# Patient Record
Sex: MALE | Race: BLACK OR AFRICAN AMERICAN | Employment: STUDENT | URBAN - METROPOLITAN AREA
[De-identification: names, ages, dates, MRNs, and addresses within clinical notes are randomized per-mention and may not be internally consistent; named-entity substitution may affect disease eponyms.]

---

## 2017-01-01 ENCOUNTER — HOSPITAL ENCOUNTER (OUTPATIENT)
Dept: OTHER | Age: 22
Discharge: HOME | End: 2017-01-31
Attending: ORTHOPAEDIC SURGERY | Admitting: ORTHOPAEDIC SURGERY

## 2017-02-01 ENCOUNTER — HOSPITAL ENCOUNTER (OUTPATIENT)
Dept: OTHER | Age: 22
Discharge: HOME | End: 2017-02-28
Attending: ORTHOPAEDIC SURGERY | Admitting: ORTHOPAEDIC SURGERY

## 2017-03-01 ENCOUNTER — HOSPITAL ENCOUNTER (OUTPATIENT)
Dept: OTHER | Age: 22
Discharge: HOME | End: 2017-03-08
Attending: ORTHOPAEDIC SURGERY | Admitting: ORTHOPAEDIC SURGERY

## 2017-04-10 ENCOUNTER — OFFICE VISIT (OUTPATIENT)
Dept: FAMILY MEDICINE CLINIC | Age: 22
End: 2017-04-10

## 2017-04-10 VITALS
HEIGHT: 70 IN | TEMPERATURE: 98.2 F | HEART RATE: 60 BPM | DIASTOLIC BLOOD PRESSURE: 68 MMHG | OXYGEN SATURATION: 97 % | SYSTOLIC BLOOD PRESSURE: 106 MMHG | RESPIRATION RATE: 12 BRPM | BODY MASS INDEX: 24.77 KG/M2 | WEIGHT: 173 LBS

## 2017-04-10 DIAGNOSIS — J02.9 PHARYNGITIS, UNSPECIFIED ETIOLOGY: ICD-10-CM

## 2017-04-10 DIAGNOSIS — H10.33 ACUTE BACTERIAL CONJUNCTIVITIS OF BOTH EYES: Primary | ICD-10-CM

## 2017-04-10 PROCEDURE — 99213 OFFICE O/P EST LOW 20 MIN: CPT | Performed by: NURSE PRACTITIONER

## 2017-04-10 RX ORDER — CIPROFLOXACIN HYDROCHLORIDE 3.5 MG/ML
1 SOLUTION/ DROPS TOPICAL 4 TIMES DAILY
Qty: 1 BOTTLE | Refills: 0 | Status: SHIPPED | OUTPATIENT
Start: 2017-04-10 | End: 2017-04-20

## 2018-01-24 ENCOUNTER — HOSPITAL ENCOUNTER (OUTPATIENT)
Dept: OTHER | Age: 23
Discharge: HOME | End: 2018-01-31
Attending: ORTHOPAEDIC SURGERY | Admitting: ORTHOPAEDIC SURGERY

## 2018-02-01 ENCOUNTER — HOSPITAL ENCOUNTER (OUTPATIENT)
Dept: OTHER | Age: 23
Discharge: HOME | End: 2018-02-28
Attending: ORTHOPAEDIC SURGERY | Admitting: ORTHOPAEDIC SURGERY

## 2018-03-01 ENCOUNTER — HOSPITAL ENCOUNTER (OUTPATIENT)
Dept: OTHER | Age: 23
Discharge: HOME | End: 2018-03-31
Attending: ORTHOPAEDIC SURGERY | Admitting: ORTHOPAEDIC SURGERY

## 2018-04-01 ENCOUNTER — HOSPITAL ENCOUNTER (OUTPATIENT)
Dept: OTHER | Age: 23
Discharge: HOME | End: 2018-04-30
Attending: ORTHOPAEDIC SURGERY | Admitting: ORTHOPAEDIC SURGERY

## 2018-05-01 ENCOUNTER — HOSPITAL ENCOUNTER (OUTPATIENT)
Dept: OTHER | Age: 23
Discharge: HOME | End: 2018-05-31
Attending: ORTHOPAEDIC SURGERY | Admitting: ORTHOPAEDIC SURGERY

## 2018-06-01 ENCOUNTER — HOSPITAL ENCOUNTER (OUTPATIENT)
Dept: OTHER | Age: 23
Discharge: HOME | End: 2018-06-20
Attending: ORTHOPAEDIC SURGERY | Admitting: ORTHOPAEDIC SURGERY

## 2018-08-20 ENCOUNTER — HOSPITAL ENCOUNTER (OUTPATIENT)
Dept: GENERAL RADIOLOGY | Age: 23
Discharge: HOME | End: 2018-08-20
Attending: ORTHOPAEDIC SURGERY | Admitting: ORTHOPAEDIC SURGERY

## 2018-08-20 DIAGNOSIS — M25.562 LEFT KNEE PAIN, UNSPECIFIED CHRONICITY: ICD-10-CM

## 2020-01-15 ENCOUNTER — HOSPITAL ENCOUNTER (OUTPATIENT)
Dept: PHYSICAL THERAPY | Age: 25
Setting detail: THERAPIES SERIES
Discharge: HOME | End: 2020-01-15
Payer: COMMERCIAL

## 2020-01-15 PROCEDURE — 97161 PT EVAL LOW COMPLEX 20 MIN: CPT

## 2020-01-15 PROCEDURE — 97140 MANUAL THERAPY 1/> REGIONS: CPT

## 2020-01-16 NOTE — FLOWSHEET NOTE
[facility] ([facility]) Physical Therapy at [address], [place], [address]  Phone: [phone]   Fax:[phone]         Physical Therapy Treatment Note  Date:  2020    Patient Name:  Soledad Carcamo      :  1995    MRN: [id number]    Diagnosis: Left Shoulder Laterjet 20       Physician: Dr Julia Russell  Next Doctor Visit:  Pt isnt sure     Insurance/Certification information:      Plan of care signed (Y/N): Pending  Changes to ambulatory summary sheet? 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 L shoulder laterjet 2020     Patient History/Mechanism of Injury: Pt took a hit during a football game in Sept (his senior year) and felt it dislocate. His shoulder was reduced on the sideline. He finished the season wearing a shoulder brace. Laterjet surgery 20. He does not have a parent in town so had a friend drop him off for surgery, someone else came back and got him so he was not able to attend [facility] PT the day after surgery. Subjective/Pain:       01-15-20     Ant Shoulder sharp ache 3-9/10. Taking hydrocodone every 6hrs as prescribed     Unable to sleep more than 1-2 hrs at a time. Had to have roommate help him get dressed this morning             Objective: See Eval. Incision dry with steri strips.       Exercises:  L Laterjet 01-14-20 01-15-20      Visit #1      01/15=1day     ROM          Flex 30     Abd 30     IR at 30 abd 30     ER at 30 abd 0     Manual      Supine PROM To open endfeel only 10 min           Exercises      Neck SB stretches      Seated Scap Retraction      Wrist flex Ext                                                                         Home Exercises Given: Wear brace at all times    Assessment:  Looks good for one day post op    Treatment/Activity Tolerance:  [] Patient tolerated treatment well [] Patient limited by fatigue  [x] Patient limited by pain  [] Patient limited by other medical complications  [] Other:     Plan: See Attached Protocol    Time In/Time Out:  2562-5467                      Timed Code/Total Treatment Minutes:  Eval Man    Electronically signed by:

## 2020-01-16 NOTE — PROGRESS NOTES
hours  Sleeping all night   Posture Wearing sling, guarded, painful posture Normal upright without compensatory patterns   Scapular Symmetry Unable to assess due to 1 day postop symmetrical   PROM Flexion 30 deg PROM To tolerance at wk 6   PROM ER (Supine 30 abd) 0 deg To tolerance at wk 6   PROM IR (Supine 30 abd) 25deg To tolerance at wk 6   MMT Biceps NT Assess at wk 12   MMT Triceps NT Wk 12   Microfet Abd  NT Wk 12   Microfet Flexion NT Wk 12   Microfet ER Elbow Support NT Wk 12   Microfet ir Elbow Support NT Wk 12     Goal Status: [] Achieved [] Partially Achieved  [x] Not Achieved, established today     Assessment:  LOOKS GREAT FOR ONE DAY POSTOP. INCISION DRY, COVERED WITH STERISTRIPS. Rehab Potential:   [x] Excellent [] Good [] Fair  [] Poor     Education on: surgical procedure, precautions. Plan: Will see 2X per week for 4 weeks then re-assess. Follow attached protocol    [x] Therapeutic Exercise  [x] Modalities:  [] Ultrasound [] Electrical Stimulation [] Cervical Traction   [x] Therapeutic Activity        [] Gait Training        [x] Neuromuscular Re-education   [x] Instruction in HEP        [x] Manual Therapy        [] Aquatic Therapy                               [] Vasopneumatic   [] Other    Physical Therapy Certification/Re-Certification Form    Dr Ramirez Vu   The following patient has been evaluated for physical therapy services and for therapy to continue, insurance requires physician review of the treatment plan initially and every 90 days. Please review the attached evaluation and/or summary of the patient's plan of care, and verify that you agree therapy should continue by signing the attached document and sending it back to our office. Patient agrees with established plan of care and assisted in the development of their short term and long term goals. Patient had no adverse reaction with initial treatment and there are no barriers to learning.  Demonstrates no mental or cognitive

## 2020-01-20 ENCOUNTER — HOSPITAL ENCOUNTER (OUTPATIENT)
Dept: PHYSICAL THERAPY | Age: 25
Setting detail: THERAPIES SERIES
Discharge: HOME | End: 2020-01-20
Payer: COMMERCIAL

## 2020-01-20 PROCEDURE — 97110 THERAPEUTIC EXERCISES: CPT

## 2020-01-20 PROCEDURE — 97140 MANUAL THERAPY 1/> REGIONS: CPT

## 2020-01-20 NOTE — FLOWSHEET NOTE
[facility] ([facility]) Physical Therapy at [address] [place], [address]  Phone: [phone]   Fax:[phone]         Physical Therapy Treatment Note  Date:  2020    Patient Name:  Christian Cantu      :  1995    MRN: [id number]    Diagnosis: Left Shoulder Laterjet 20       Physician: Dr Valentino Chaco  Next Doctor Visit:  Pt isnt sure     Insurance/Certification information:      Plan of care signed (Y/N): Pending  Changes to ambulatory summary sheet? 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 L shoulder laterjet 2020     Patient History/Mechanism of Injury: Pt took a hit during a football game in Sept (his senior year) and felt it dislocate. His shoulder was reduced on the sideline. He finished the season wearing a shoulder brace. Laterjet surgery 20. He does not have a parent in town so had a friend drop him off for surgery, someone else came back and got him so he was not able to attend [facility] PT the day after surgery. Subjective/Pain:       01-15-20   01-20-20    Ant Shoulder sharp ache 3-9/10. Taking hydrocodone every 6hrs as prescribed  Ant shoulder ache 1-8/10. Worst in am when he first wakes. Taking one dose of hydrocodone per day   Unable to sleep more than 1-2 hrs at a time. Had to have   roommate help him get dressed   this morning   Waking 1-2 times per night due to pain. Dressing independently.   Numbness in 4th and 5th fingers a couple times a day for 5-10 min, pt thinks the brace puts pressure on elbow so he changes positions and numbness goes away      Objective:

## 2020-01-27 ENCOUNTER — HOSPITAL ENCOUNTER (OUTPATIENT)
Dept: PHYSICAL THERAPY | Age: 25
Setting detail: THERAPIES SERIES
Discharge: HOME | End: 2020-01-27
Payer: COMMERCIAL

## 2020-01-27 PROCEDURE — 97530 THERAPEUTIC ACTIVITIES: CPT

## 2020-01-27 PROCEDURE — 97110 THERAPEUTIC EXERCISES: CPT

## 2020-01-27 PROCEDURE — 97112 NEUROMUSCULAR REEDUCATION: CPT

## 2020-01-27 NOTE — FLOWSHEET NOTE
See Eval. Incision dry with steri strips.     Exercises:  L Angel Luis 01-14-20 01-15-20 01-21-20 02-27-20    Visit #1 Visit#2 Visit#    01/15=1day 01/21=1wk 1/28=2wks   ROM          Flex 30 80 90   Abd 30 50 70   IR at 30 abd 30 45 open 45 open   ER at 30 abd 0 5 17deg   Manual      Supine PROM To open endfeel only 10 min 20 min today 20 min manual stretching         Exercises      Neck SB stretches  2e06liu ea way 8l28atb   Seated Scap Retraction  Seated x30 Seated with arms on table x30   Wrist flex Ext  Ext 1# on [id number], flex with arm at side x30 1#x30   Table slide  Used bedside table+chair 3g13nyk [id number] on table, added ER 6b28obq   Standing IR ER AROM   Arms at side x30                     Home Exercises Given: Wear brace at all times  DASH=77.5%    Assessment:  Looks good for two weeks postop    Treatment/Activity Tolerance:  [x] Patient tolerated treatment well [] Patient limited by fatigue  [x] Patient limited by pain  [] Patient limited by other medical complications  [] Other:     Plan: twice a week, follow attached protocol    Time In/Time Out:  9740-0507                    Timed Code/Total Treatment Minutes:  Man1 TE1 TA1    Electronically signed by:

## 2020-01-30 ENCOUNTER — HOSPITAL ENCOUNTER (OUTPATIENT)
Dept: PHYSICAL THERAPY | Age: 25
Setting detail: THERAPIES SERIES
Discharge: HOME | End: 2020-01-30
Payer: COMMERCIAL

## 2020-01-30 PROCEDURE — 97530 THERAPEUTIC ACTIVITIES: CPT

## 2020-01-30 PROCEDURE — 97140 MANUAL THERAPY 1/> REGIONS: CPT

## 2020-01-30 NOTE — FLOWSHEET NOTE
[address]  Physical Therapy at [address], [place], [address]  Phone: [phone]   Fax:[phone]         Physical Therapy Treatment Note  Date:  2020    Patient Name:  Jersey Choi      :  1995    MRN: [id number]    Diagnosis: Left Shoulder Laterjet 20       Physician: Dr John Rios  Next Doctor Visit:  Pt isnt sure     Insurance/Certification information:      Plan of care signed (Y/N): Pending  Changes to ambulatory summary sheet? 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 L shoulder laterjet 2020     Patient History/Mechanism of Injury: Pt took a hit during a football game in Sept (his senior year) and felt it dislocate. His shoulder was reduced on the sideline. He finished the season wearing a shoulder brace. Laterjet surgery 20. He does not have a parent in town so had a friend drop him off for surgery, someone else came back and got him so he was not able to attend [facility] PT the day after surgery. Subjective/Pain:       01-15-20   01-27-20    Ant Shoulder sharp ache 3-9/10. Taking hydrocodone every 6hrs as prescribed  Ant shoulder ache 5/10 first thing in am, rest of day = 1-2/10 average. No hydrocodone    Unable to sleep more than 1-2 hrs at a time. Had to have   roommate help him get dressed   this morning   Not waking due to pain. Dressing independently. Numbness in 4th and 5th fingers is gone     Objective: Incisions dry, healed and looks good. Steri strips off.     Exercises:  L Laterjet 01-14-20 01-15-20 01-21-20 02-27-20 01-30-20    Visit #1 Visit#2 Visit# Visit#4    01/15=1day 01/21=1wk 1/28=2wks 2/4=3wks   ROM           Flex 30 80 90 95   Abd 30 50 70 70   IR at 30 abd 30 45 open 45 open 50 open   ER at 30 abd 0 5 17deg 25 deg   Manual       Supine PROM To open endfeel only 10 min 20 min today 20 min manual stretching 20 min manual gentle stretching           Exercises       Neck SB stretches  3h70gfi ea way 2i95run 1b32bhw   Seated Scap Retraction  Seated x30 Seated with arms on table x30 Seated x30   Wrist flex Ext  Ext 1# on [id number], flex with arm at side x30 1#x30 2# x30 flex + Ext   Table slide  Used bedside table+chair 2w94fds unyw1f31 on table, added ER 6x18nen 0r81yjm flex  2x60 sec ER   Standing IR ER AROM   Arms at side x30 x30 arms at side   Ball closed chain on table    x30 fwd/back  x30 side to side   PPT    Next rx          Home Exercises Given: Wear brace at all times except when doing exercises    DASH=77.5%    Assessment:  Looks great for 2.5 wks post op.   Tight ER, stopping at endfeel    Treatment/Activity Tolerance:  [x] Patient tolerated treatment well [] Patient limited by fatigue  [x] Patient limited by pain  [] Patient limited by other medical complications  [] Other:     Plan: twice a week, follow attached protocol    Time In/Time Out:  6011-2720                    Timed Code/Total Treatment Minutes:  Cameron Lagunas    Electronically signed by:

## 2020-02-03 ENCOUNTER — HOSPITAL ENCOUNTER (OUTPATIENT)
Dept: PHYSICAL THERAPY | Age: 25
Setting detail: THERAPIES SERIES
Discharge: HOME | End: 2020-02-03
Payer: COMMERCIAL

## 2020-02-03 PROCEDURE — 97140 MANUAL THERAPY 1/> REGIONS: CPT

## 2020-02-03 PROCEDURE — 97530 THERAPEUTIC ACTIVITIES: CPT

## 2020-02-03 NOTE — FLOWSHEET NOTE
1/28=2wks 2/4=3wks 2/4=3wks   ROM          Flex 90 95 95   Abd 70 70 70   IR at 30 abd 45 open 50 open    ER at 30 abd 17deg 25 deg 25deg   Manual      Supine PROM 20 min manual stretching 20 min manual gentle stretching  20min         Exercises      Neck SB stretches 3i02krn 5v55veb 6q57ctq   Seated Scap Retraction Seated with arms on table x30 Seated x30 Seated x30   Wrist flex Ext 1#x30 2# x30 flex + Ext 2#x30   Table slide [id number] on table, added ER 0y06sba 1x63gfz flex  2x60 sec ER 2h28nce flex  2c75yxf ER   Standing IR ER AROM Arms at side x30 x30 arms at side x30 arms at side   Ball closed chain on table  x30 fwd/back  x30 side to side x30cw x30ccw   PPT  Next rx x30         Home Exercises Given: Wear brace at all times except when doing exercises    DASH=77.5%    Assessment:  Looks great for 3wks post op.   Tight ER, stopping at endfeel    Treatment/Activity Tolerance:  [x] Patient tolerated treatment well [] Patient limited by fatigue  [x] Patient limited by pain  [] Patient limited by other medical complications  [] Other:     Plan: twice a week, follow attached protocol    Time In/Time Out:  6374-0637                  Timed Code/Total Treatment Minutes:  Po Hobson    Electronically signed by:

## 2020-02-06 ENCOUNTER — HOSPITAL ENCOUNTER (OUTPATIENT)
Dept: PHYSICAL THERAPY | Age: 25
Setting detail: THERAPIES SERIES
Discharge: HOME | End: 2020-02-06
Payer: COMMERCIAL

## 2020-02-06 PROCEDURE — 97110 THERAPEUTIC EXERCISES: CPT

## 2020-02-06 PROCEDURE — 97112 NEUROMUSCULAR REEDUCATION: CPT

## 2020-02-06 PROCEDURE — 97530 THERAPEUTIC ACTIVITIES: CPT

## 2020-02-06 NOTE — FLOWSHEET NOTE
[facility] ([facility]) Physical Therapy at [address], [address]  Phone: [phone]   Fax:[phone]         Physical Therapy Treatment Note  Date:  2020    Patient Name:  Akilah Palacios      :  1995    MRN: [id number]    Diagnosis: Left Shoulder Laterjet 20       Physician: Dr Elisabet Adams  Next Doctor Visit:  Pt isnt sure     Insurance/Certification information:      Plan of care signed (Y/N): Pending  Changes to ambulatory summary sheet? 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 L shoulder laterjet 2020     Patient History/Mechanism of Injury: Pt took a hit during a football game in Sept (his senior year) and felt it dislocate. His shoulder was reduced on the sideline. He finished the season wearing a shoulder brace. Laterjet surgery 20. He does not have a parent in town so had a friend drop him off for surgery, someone else came back and got him so he was not able to attend [facility] PT the day after surgery. Subjective/Pain:       01-15-20   02-03-20    Ant Shoulder sharp ache 3-9/10. Taking hydrocodone every 6hrs as prescribed  Ant shoulder ache 5/10 first thing in am, rest of day = 1-2/10 average. No hydrocodone    Unable to sleep more than 1-2 hrs at a time. Had to have   roommate help him get dressed   this morning   Not waking due to pain. Dressing independently. Numbness in 4th and 5th fingers is gone     Objective: Incisions dry, healed and looks good. Steri strips off.     Exercises:  L Laterjet 20    Visit# Visit#4 Visit#5 Visit#6    1/28=2wks 2/4=3wks 2/4=3wks 2/4=3wks   ROM           Flex 90 95 95    Abd 70 70 70    IR at 30 abd 45 open 50 open  50 open   ER at 30 abd 17deg 25 deg 25deg 30 **tight   Manual       Supine PROM 20 min manual stretching 20 min manual gentle stretching  20min 10min          Exercises       Neck SB stretches 3m95hex 4j46nop 0o43qzc    Seated Scap Retraction Seated with arms on table x30 Seated x30 Seated x30 Seated x30   Wrist flex Ext 1#x30 2# x30 flex + Ext 2#x30 3#x30   Table slide [id number] on table, added ER 5h92ydv 3j56tcf flex  2x60 sec ER 4f31feq flex  5g81znx ER 7f98ebd flex  7v78rug ER   SL ER    x30   Standing IR ER AROM Arms at side x30 x30 arms at side x30 arms at side x30   Ball closed chain on table  x30 fwd/back  x30 side to side x30cw x30ccw On wall side to side and up/down x30   PPT  Next rx x30           Home Exercises Given: Wear brace at all times except when doing exercises    DASH=77.5%    Assessment:  Looks great for 3wks post op.   Tight ER, stopping at endfeel    Treatment/Activity Tolerance:  [x] Patient tolerated treatment well [] Patient limited by fatigue  [x] Patient limited by pain  [] Patient limited by other medical complications  [] Other:     Plan: twice a week, follow attached protocol    Time In/Time Out:  0785-6296                 Timed Code/Total Treatment Minutes:  Augustine Villegas    Electronically signed by:

## 2020-02-10 ENCOUNTER — HOSPITAL ENCOUNTER (OUTPATIENT)
Dept: PHYSICAL THERAPY | Age: 25
Setting detail: THERAPIES SERIES
Discharge: HOME | End: 2020-02-10
Payer: COMMERCIAL

## 2020-02-10 PROCEDURE — 97140 MANUAL THERAPY 1/> REGIONS: CPT

## 2020-02-10 PROCEDURE — 97110 THERAPEUTIC EXERCISES: CPT

## 2020-02-10 PROCEDURE — 97530 THERAPEUTIC ACTIVITIES: CPT

## 2020-02-10 NOTE — FLOWSHEET NOTE
o Gain muscular endurance with high repetition of 30-50, low resistance 1-3 lbs)   o Exercises should be progressive in terms of muscle demand / intensity, shoulder elevation, and stress on the anterior joint capsule   o Nearly full elevation in the scapula plane should be achieved before beginning elevation in other planes   o All activities should be pain free and without substitution patterns   o Exercises should consist of both open and closed chain activities   o No heavy lifting or plyometrics should be performed at this time   - Initiate full can scapular plane raises to 90 degrees with good mechanics   - Initiate ER/IR strengthening using exercise tubing at 0° of abduction (use towel roll)   - Initiate sidelying ER with towel roll   - Initiate manual resistance ER supine in scapular plane (light resistance)   - Initiate prone rowing at 30/45/90 degrees of abduction to neutral arm position    Continued cryotherapy for pain and inflammation    Continued patient education: posture, joint protection, positioning, hygiene, etc.       Exercises:  L Laterjet 01-14-20 02-03-20 02-06-20 02-10-20    Visit#5 Visit#6 Visit#7    2/4=3wks 2/4=3wks 2/11=4wks   Bike   5min   ROM          Flex 95  100   IR at 30 abd  50 open 60   ER at 30 abd 25deg 30 **tight 35 tight   Manual      Supine PROM 20min 10min 10min   LLLD ER stretch   Yellow tb 3min   Exercises      Table slides ER + Flex   7h29zzj each   SL ER  x20 x20   Cane punch   to90 x30   PPT   x30   rythmic stabs   Next rx   Standing Scap Retraction Seated x30 Seated x30 x30   Standing ER   x30   Standing Isometrics   IR ER 5sec hold x10, add flex + abd next rx   Wrist flex Ext 2#x30 3#x30 3#x30   Ball closed chain on wall x30cw x30ccw On wall side to side and up/down x30 On wall x30 up down + x30 side to side         Home Exercises Given: Wear brace at all times except when doing exercises     DASH=77.5%    Assessment: Tight ER, push home stretching    Treatment/Activity Tolerance:  [x] Patient tolerated treatment well [] Patient limited by fatigue  [x] Patient limited by pain  [] Patient limited by other medical complications  [] Other:     Plan: twice a week, follow attached protocol    Time In/Time Out:  5592-6863                 Timed Code/Total Treatment Minutes:  Juancarlos Alonso    Electronically signed by:

## 2020-02-13 ENCOUNTER — HOSPITAL ENCOUNTER (OUTPATIENT)
Dept: PHYSICAL THERAPY | Age: 25
Setting detail: THERAPIES SERIES
Discharge: HOME | End: 2020-02-13
Payer: COMMERCIAL

## 2020-02-13 PROCEDURE — 97140 MANUAL THERAPY 1/> REGIONS: CPT

## 2020-02-13 PROCEDURE — 97530 THERAPEUTIC ACTIVITIES: CPT

## 2020-02-13 PROCEDURE — 97110 THERAPEUTIC EXERCISES: CPT

## 2020-02-13 NOTE — FLOWSHEET NOTE
[facility]) Physical Therapy at [address], [place], [address]  Phone: [phone]   Fax:[phone]         Physical Therapy Treatment Note  Date:  2020    Patient Name:  Bentley Carr      :  1995    MRN: [id number]    Diagnosis: Left Shoulder Laterjet 20       Physician: Dr Valentino Rua  Next Doctor Visit:  Pt isnt sure     Insurance/Certification information:      Plan of care signed (Y/N): Pending  Changes to ambulatory summary sheet? 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 L shoulder laterjet 2020     Patient History/Mechanism of Injury: Pt took a hit during a football game in Sept (his senior year) and felt it dislocate. His shoulder was reduced on the sideline. He finished the season wearing a shoulder brace. Laterjet surgery 20. He does not have a parent in town so had a friend drop him off for surgery, someone else came back and got him so he was not able to attend [address] [place] [address] the day after surgery. Subjective/Pain:  Saw Dr Valentino Rua and sling was discharged, to go back to [place] in April     01-15-20   02-13-20    Ant Shoulder sharp ache 3-9/10. Taking hydrocodone every 6hrs as prescribed  Ant shoulder stiff 6/10 first thing in am, no pain during the day = 0-1/10 average. No hydrocodone    Unable to sleep more than 1-2 hrs at a time. Had to have   roommate help him get dressed   this morning   Not waking due to pain. Dressing independently.   Numbness in 4th and 5th fingers is gone     Objective: Tight ER    Early Phase II (approximately week 4):    Progress shoulder PROM (do not force any painful motion)   o Forward flexion and elevation to tolerance   o Abduction in the plane of the scapula to tolerance   o IR to 45 degrees at 30 degrees of abduction   o ER to 0-45 degrees; begin at 30-40 degrees of abduction; respect anterior capsule tissue integrity with ER range of motion; seek guidance from intraoperative measurements of external rotation ROM)    Glenohumeral joint mobilizations as indicated (Grade I, II) when ROM is significantly less than expected. Mobilizations should be done in directions of limited motion and only until adequate ROM is gained.  Address scapulothoracic and trunk mobility limitations. Scapulothoracic and thoracic spine joint mobilizations as indicated (Grade I, II, III) when ROM is significantly less than expected. Mobilizations should be done in directions of limited and only until adequate ROM is gained.  Begin incorporating posterior capsular stretching as indicated    Cross body adduction stretch    Side lying internal rotation stretch (sleeper stretch) . Late Phase II (approximately Week 6):     Progress shoulder PROM (do not force any painful motion)   o Forward flexion, elevation, and abduction in the plane of the scapula to tolerance   o IR as tolerated at multiple angles of abduction   o ER to tolerance; progress to multiple angles of abduction once >/= 35 degrees at 0-40 degrees of abduction    Glenohumeral and scapulothoracic joint mobilizations as indicated (Grade I-IV as appropriate)    Progress to AA/AROM activities of the shoulder as tolerated with good shoulder mechanics (i.e. minimal to no scapulathoracic substitution with up to  degrees of elevation.)    Begin rhythmic stabilization drills    ER/IR in the scapular plane    Flexion/extension and abduction/adduction at various angles of elevation    Continue AROM elbow, wrist, and hand    Strengthen scapular retractors and upward rotators    Initiate

## 2020-02-17 ENCOUNTER — HOSPITAL ENCOUNTER (OUTPATIENT)
Dept: PHYSICAL THERAPY | Age: 25
Setting detail: THERAPIES SERIES
Discharge: HOME | End: 2020-02-17
Payer: COMMERCIAL

## 2020-02-17 PROCEDURE — 97110 THERAPEUTIC EXERCISES: CPT

## 2020-02-17 PROCEDURE — 97530 THERAPEUTIC ACTIVITIES: CPT

## 2020-02-17 PROCEDURE — 97140 MANUAL THERAPY 1/> REGIONS: CPT

## 2020-02-17 NOTE — FLOWSHEET NOTE
[facility] ([facility]) Physical Therapy at [address], [address], [address]  Phone: [phone]   Fax:[phone]         Physical Therapy Treatment Note  Date:  2020    Patient Name:  Natasha Moser      :  1995    MRN: [id number]    Diagnosis: Left Shoulder Laterjet 20       Physician: Dr Elisha Aguirre  Next Doctor Visit:  Pt isnt sure     Insurance/Certification information:      Plan of care signed (Y/N): Pending  Changes to ambulatory summary sheet? 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 in 2018 after dislocating his sophomore year of football  L shoulder dislocation after taking a hit during a football game, pt wore a sling and finished season. L shoulder laterjet 2020     Patient History/Mechanism of Injury: Pt took a hit during a football game in Sept (his senior year) and felt it dislocate. His shoulder was reduced on the sideline. He finished the season wearing a shoulder brace. Laterjet surgery 20. He does not have a parent in town so had a friend drop him off for surgery, someone else came back and got him so he was not able to attend [facility] PT the day after surgery. Subjective/Pain:      01-15-20   02-17-20    Ant Shoulder sharp ache 3-9/10. Taking hydrocodone every 6hrs as prescribed  Ant shoulder stiff 6/10 first thing in am, Pt reports no pain today and shoulder is doing well. Unable to sleep more than 1-2 hrs at a time. Had to have   roommate help him get dressed   this morning   Not waking due to pain. Dressing independently.   Numbness in 4th and 5th fingers is gone     Objective: Tight ER    Early Phase II (approximately week 4):    Progress shoulder PROM (do not force any painful motion)   o Forward flexion and elevation to tolerance   o Abduction in the plane of the scapula to tolerance   o IR to 45 degrees at 30 degrees of abduction   o ER to 0-45 degrees; begin at 30-40 degrees of abduction; respect anterior capsule tissue integrity with ER range of motion; seek guidance from intraoperative measurements of external rotation ROM)    Glenohumeral joint mobilizations as indicated (Grade I, II) when ROM is significantly less than expected. Mobilizations should be done in directions of limited motion and only until adequate ROM is gained.  Address scapulothoracic and trunk mobility limitations. Scapulothoracic and thoracic spine joint mobilizations as indicated (Grade I, II, III) when ROM is significantly less than expected. Mobilizations should be done in directions of limited and only until adequate ROM is gained.  Begin incorporating posterior capsular stretching as indicated    Cross body adduction stretch    Side lying internal rotation stretch (sleeper stretch) . Late Phase II (approximately Week 6):     Progress shoulder PROM (do not force any painful motion)   o Forward flexion, elevation, and abduction in the plane of the scapula to tolerance   o IR as tolerated at multiple angles of abduction   o ER to tolerance; progress to multiple angles of abduction once >/= 35 degrees at 0-40 degrees of abduction    Glenohumeral and scapulothoracic joint mobilizations as indicated (Grade I-IV as appropriate)    Progress to AA/AROM activities of the shoulder as tolerated with good shoulder mechanics (i.e. minimal to no scapulathoracic substitution with up to  degrees of elevation.)    Begin rhythmic stabilization drills    ER/IR in the scapular plane    Flexion/extension and abduction/adduction at various angles of elevation    Continue AROM elbow, wrist, and hand    Strengthen scapular retractors and upward rotators    Initiate balanced AROM / strengthening program   o Initially in low dynamic x30 side to side On wall x30 up down x30 side to X 30 up/down, side to side         Home Exercises Given: Wear brace at all times except when doing exercises     DASH=77.5%    Assessment: Pt continues to present with tight ER and requiring cues to relax into stretching. Pt able to tolerate increase in exercises with no pain. Pt demonstrates good form with exercises.      Treatment/Activity Tolerance:  [x] Patient tolerated treatment well [] Patient limited by fatigue  [x] Patient limited by pain  [] Patient limited by other medical complications  [] Other:     Plan: twice a week, follow attached protocol    Time In/Time Out:  2400-1591               Timed Code/Total Treatment Minutes: 1 Manual, 1 TA, 1TE    Electronically signed by:     Tom Chávez, PTA [id number]

## 2020-02-20 ENCOUNTER — HOSPITAL ENCOUNTER (OUTPATIENT)
Dept: PHYSICAL THERAPY | Age: 25
Setting detail: THERAPIES SERIES
Discharge: HOME | End: 2020-02-20
Payer: COMMERCIAL

## 2020-02-20 PROCEDURE — 97140 MANUAL THERAPY 1/> REGIONS: CPT

## 2020-02-20 PROCEDURE — 97110 THERAPEUTIC EXERCISES: CPT

## 2020-02-20 PROCEDURE — 97530 THERAPEUTIC ACTIVITIES: CPT

## 2020-02-20 NOTE — FLOWSHEET NOTE
[facility] ([facility]) Physical Therapy at [address], [place], [address]  Phone: [phone]   Fax:[phone]         Physical Therapy Treatment Note  Date:  2020    Patient Name:  Karoline Walter      :  1995    MRN: [id number]    Diagnosis: Left Shoulder Laterjet 20       Physician: Dr Tutu Otoole  Next Doctor Visit:  Pt isnt sure     Insurance/Certification information:      Plan of care signed (Y/N): Pending  Changes to ambulatory summary sheet? 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 L shoulder laterjet 2020     Patient History/Mechanism of Injury: Pt took a hit during a football game in Sept (his senior year) and felt it dislocate. His shoulder was reduced on the sideline. He finished the season wearing a shoulder brace. Laterjet surgery 20. He does not have a parent in town so had a friend drop him off for surgery, someone else came back and got him so he was not able to attend [facility] PT the day after surgery. Subjective/Pain:      01-15-20   02-20-20    Ant Shoulder sharp ache 3-9/10. Taking hydrocodone every 6hrs as prescribed  Ant shoulder stiff 6/10 first thing in am, Pt reports no pain today and shoulder is doing well. Unable to sleep more than 1-2 hrs at a time. Had to have   roommate help him get dressed   this morning   Not waking due to pain. Dressing independently.   Numbness in 4th and 5th fingers is gone     Objective: Tight ER    Early Phase II (approximately week 4):    Progress shoulder PROM (do not force any painful motion)   o Forward flexion and elevation to tolerance   o Abduction in the plane of the scapula to tolerance   o IR to 45 degrees at 30 degrees of abduction   o ER to 0-45 degrees; begin at 30-40 degrees of abduction; respect anterior capsule tissue integrity with ER range of motion; seek guidance from intraoperative measurements of external rotation ROM)    Glenohumeral joint mobilizations as indicated (Grade I, II) when ROM is significantly less than expected. Mobilizations should be done in directions of limited motion and only until adequate ROM is gained.  Address scapulothoracic and trunk mobility limitations. Scapulothoracic and thoracic spine joint mobilizations as indicated (Grade I, II, III) when ROM is significantly less than expected. Mobilizations should be done in directions of limited and only until adequate ROM is gained.  Begin incorporating posterior capsular stretching as indicated    Cross body adduction stretch    Side lying internal rotation stretch (sleeper stretch) . Late Phase II (approximately Week 6):     Progress shoulder PROM (do not force any painful motion)   o Forward flexion, elevation, and abduction in the plane of the scapula to tolerance   o IR as tolerated at multiple angles of abduction   o ER to tolerance; progress to multiple angles of abduction once >/= 35 degrees at 0-40 degrees of abduction    Glenohumeral and scapulothoracic joint mobilizations as indicated (Grade I-IV as appropriate)    Progress to AA/AROM activities of the shoulder as tolerated with good shoulder mechanics (i.e. minimal to no scapulathoracic substitution with up to  degrees of elevation.)    Begin rhythmic stabilization drills    ER/IR in the scapular plane    Flexion/extension and abduction/adduction at various angles of elevation    Continue AROM elbow, wrist, and hand    Strengthen scapular retractors and upward rotators    Initiate balanced AROM / strengthening program   o Initially in low dynamic Standing Isometrics IR ER 5sec hold x10, add flex + abd next rx IR ER 5sec hold x10, add flex + abd next rx IR, ER, Flex, abd 5 seconds x 10 Flex abdx10   Wrist flex Ext 3#x30 3#x30 3# x 30 3#x30   Ball closed chain on wall On wall x30 up down + x30 side to side On wall x30 up down x30 side to X 30 up/down, side to side x30 up/down  Side to side  flexion          Home Exercises Given: Wear brace at all times except when doing exercises     DASH=77.5%    Assessment: PUSH ER    Treatment/Activity Tolerance:  [x] Patient tolerated treatment well [] Patient limited by fatigue  [x] Patient limited by pain  [] Patient limited by other medical complications  [] Other:     Plan: twice a week, follow attached protocol    Time In/Time Out:  7418-0356               Timed Code/Total Treatment Minutes: 1 Manual, 1 TA, 1TE    Electronically signed by:

## 2020-02-24 ENCOUNTER — HOSPITAL ENCOUNTER (OUTPATIENT)
Dept: PHYSICAL THERAPY | Age: 25
Setting detail: THERAPIES SERIES
Discharge: HOME | End: 2020-02-24
Payer: COMMERCIAL

## 2020-02-24 PROCEDURE — 97530 THERAPEUTIC ACTIVITIES: CPT

## 2020-02-24 PROCEDURE — 97110 THERAPEUTIC EXERCISES: CPT

## 2020-02-24 NOTE — FLOWSHEET NOTE
[facility] ([facility]) Physical Therapy at [address], [place], [address]  Phone: [phone]   Fax:[phone]         Physical Therapy Treatment Note  Date:  2020    Patient Name:  Antoinette Uriostegui      :  1995    MRN: [id number]    Diagnosis: Left Shoulder Laterjet 20       Physician: Dr Trevino Monday  Next Doctor Visit:  Pt isnt sure     Insurance/Certification information:      Plan of care signed (Y/N): Pending  Changes to ambulatory summary sheet? 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 L shoulder laterjet 2020     Patient History/Mechanism of Injury: Pt took a hit during a football game in Sept (his senior year) and felt it dislocate. His shoulder was reduced on the sideline. He finished the season wearing a shoulder brace. Laterjet surgery 20. He does not have a parent in [facility] so had a friend drop him off for surgery, someone else came back and got him so he was not able to attend [facility] PT the day after surgery. Subjective/Pain:      01-15-20   02-24-20    Ant Shoulder sharp ache 3-9/10. Taking hydrocodone every 6hrs as prescribed  Ant shoulder stiff 5/10 first thing in am, Pt reports no pain today and shoulder is doing well. Unable to sleep more than 1-2 hrs at a time. Had to have   roommate help him get dressed   this morning   Not waking due to pain. Dressing independently.   Numbness in 4th and 5th fingers is gone     Objective: Tight ER    Early Phase II (approximately week 4):    Progress shoulder PROM (do not force any painful motion)   o Forward flexion and flex Ext 3# x 30 3#x30 4#x30   Ball closed chain on wall X 30 up/down, side to side x30 up/down  Side to side  flexion x30 up/down    Cane presses standing   x30 to 90deg   Home Exercises Given: Wear brace at all times except when doing exercises     DASH=77.5%    Assessment: PUSH ER!!     Treatment/Activity Tolerance:  [x] Patient tolerated treatment well [] Patient limited by fatigue  [] Patient limited by pain  [] Patient limited by other medical complications  [x] Other:     Plan: twice a week, follow attached protocol    Time In/Time Out:  7846-5811               Timed Code/Total Treatment Minutes: Felicity Thomas    Electronically signed by:

## 2020-03-02 ENCOUNTER — HOSPITAL ENCOUNTER (OUTPATIENT)
Dept: PHYSICAL THERAPY | Age: 25
Setting detail: THERAPIES SERIES
Discharge: HOME | End: 2020-03-02
Payer: COMMERCIAL

## 2020-03-02 PROCEDURE — 97530 THERAPEUTIC ACTIVITIES: CPT

## 2020-03-02 PROCEDURE — 97110 THERAPEUTIC EXERCISES: CPT

## 2020-03-02 NOTE — FLOWSHEET NOTE
positions   o Gain muscular endurance with high repetition of 30-50, low resistance 1-3 lbs)   o Exercises should be progressive in terms of muscle demand / intensity, shoulder elevation, and stress on the anterior joint capsule   o Nearly full elevation in the scapula plane should be achieved before beginning elevation in other planes   o All activities should be pain free and without substitution patterns   o Exercises should consist of both open and closed chain activities   o No heavy lifting or plyometrics should be performed at this time   - Initiate full can scapular plane raises to 90 degrees with good mechanics   - Initiate ER/IR strengthening using exercise tubing at 0° of abduction (use towel roll)   - Initiate sidelying ER with towel roll   - Initiate manual resistance ER supine in scapular plane (light resistance)   - Initiate prone rowing at 30/45/90 degrees of abduction to neutral arm position    Continued cryotherapy for pain and inflammation    Continued patient education: posture, joint protection, positioning, hygiene, etc.       Exercises:  L Laterjet 01-14-20 2-17-20 2-20-20 2-24-20 03-02-20    Visit #9 Visit#10 Visit#11 Visit#12    2/25= 6 weeks 2/25=6wks 2/25=6wks 3/3=7wks   Bike 5 min Elliptical 5 min no arms Elliptical 5 min no arms Elliptical 5 min, do .5mile no arms next rx   ROM           Flex 125 130 130 140   IR at 30 abd 60 60 60 60   ER at 30 abd 50 tight after stretching 60 TIGHT *pt will do low load long duration stretch 2-3 timesper day 70 degree tight but better than last rx 78   Manual       Supine PROM 10 min 10 min 5min 5min   LLLD ER stretch Blue TB x 5 min 5min 6min 6min   Exercises       Table slides ER + Flex 2 x 60 sec each [id number] [id number]   SL ER X 30 1#x30 1#x30 1#x30, try 2# next rx   Shoulder Flex supine  x30 1#x30 2#x30   PPT X 30 x30 x30 + PPT X30+heel touches   rythmic stabs 2 x 60 sec Body blade [id number] supine 9m90oge   TB Standing Scap Ret Blue TB x 30 Blue TB x30 Blue TB x30 Black TB x30   TB IR ER X 30 ER IR TB ER IR TB x30 ER IR x30   TB Triceps    Black TB x30   Wall Sit    3#x30   Standing Scaption DL Bosu    x30                  Wrist flex Ext 3# x 30 3#x30 4#x30 4#x30   Ball closed chain on wall X 30 up/down, side to side x30 up/down  Side to side  flexion x30 up/down  x30   Home Exercises Given: Wear brace at all times except when doing exercises     DASH=77.5%    Assessment: PUSH ER!!     Treatment/Activity Tolerance:  [x] Patient tolerated treatment well [] Patient limited by fatigue  [] Patient limited by pain  [] Patient limited by other medical complications  [x] Other:     Plan: twice a week, follow attached protocol    Time In/Time Out:  1387-6987               Timed Code/Total Treatment Minutes: Ignatius Cabot    Electronically signed by:

## 2020-03-05 ENCOUNTER — HOSPITAL ENCOUNTER (OUTPATIENT)
Dept: PHYSICAL THERAPY | Age: 25
Setting detail: THERAPIES SERIES
Discharge: HOME | End: 2020-03-05
Payer: COMMERCIAL

## 2020-03-05 PROCEDURE — 97530 THERAPEUTIC ACTIVITIES: CPT

## 2020-03-05 PROCEDURE — 97110 THERAPEUTIC EXERCISES: CPT

## 2020-03-05 NOTE — FLOWSHEET NOTE
[facility]) Physical Therapy at [address], [facility], [address]  Phone: [phone]   Fax:[phone]         Physical Therapy Treatment Note  Date:  3/5/2020    Patient Name:  Bentley Carr      :  1995    MRN: [id number]    Diagnosis: Left Shoulder Laterjet 20       Physician: Dr Valentino Rua  Next Doctor Visit:  Pt isnt sure     Insurance/Certification information:      Plan of care signed (Y/N): Pending  Changes to ambulatory summary sheet? 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 L shoulder laterjet 2020     Patient History/Mechanism of Injury: Pt took a hit during a football game in Sept (his senior year) and felt it dislocate. His shoulder was reduced on the sideline. He finished the season wearing a shoulder brace. Laterjet surgery 20. He does not have a parent in town so had a friend drop him off for surgery, someone else came back and got him so he was not able to attend [facility] PT the day after surgery. Subjective/Pain:      01-15-20   03-05-20    Ant Shoulder sharp ache 3-9/10. Taking hydrocodone every 6hrs as prescribed No pain, mild stiffness   Unable to sleep more than 1-2 hrs at a time. Had to have   roommate help him get dressed   this morning   Not waking due to pain. Dressing independently.   Numbness in 4th and 5th fingers is gone     Objective: Tight ER, improving each visit!! Goal=90 deg next rx      Exercises:  L Laterjet 20    Visit#11 Visit#12 Visit#13    =6wks 3/3=7wks 3/10=10wks   Bike Elliptical 5 min no arms Elliptical 5 min, do .5mile no arms next rx Elliptical .5mile   ROM          Flex 130 140 140   IR at 30 abd 60 60 60   ER at 30 abd 70 degree tight but better than last rx 78 83   Manual      Supine PROM 5min 5min 5min   LLLD ER stretch 6min 6min 6min   Exercises      Table slides ER + Flex 2b27mrq 9e74bhp 4e88dgq   SL ER 1#x30 1#x30, try 2# next rx 2#x30   Shoulder Flex supine 1#x30 2#x30 DC   PPT x30 + PPT X30+heel touches x20   rythmic stabs 6c46prp supine 5p50xsn Standing SL bosu IR ER 60sec   TB Standing Scap Ret Blue TB x30 Black TB x30 Standing x30   TB IR ER ER IR TB x30 ER IR x30 IR ER x30   TB Triceps  Black TB x30 Black TB x30   Wall Sit  3#x30 Wall sit 4#x30   Wall pushups   x30   Standing Scaption DL Bosu  x30  1#x30               Wrist flex Ext 4#x30 4#x30 4#x30   Ball closed chain on wall x30 up/down  x30 DC   Home Exercises Given: Wear brace at all times except when doing exercises     DASH=77.5%    Assessment: PUSH ER!! Start jogging next week possibly     Treatment/Activity Tolerance:  [x] Patient tolerated treatment well [] Patient limited by fatigue  [] Patient limited by pain  [] Patient limited by other medical complications  [x] Other:     Plan: twice a week, follow attached protocol    Time In/Time Out:  7220-3509           Timed Code/Total Treatment Minutes: 2TA, 1TE    Electronically signed by:

## 2020-03-10 ENCOUNTER — HOSPITAL ENCOUNTER (OUTPATIENT)
Dept: PHYSICAL THERAPY | Age: 25
Setting detail: THERAPIES SERIES
Discharge: HOME | End: 2020-03-10
Payer: COMMERCIAL

## 2020-03-10 PROCEDURE — 97530 THERAPEUTIC ACTIVITIES: CPT

## 2020-03-10 PROCEDURE — 97110 THERAPEUTIC EXERCISES: CPT

## 2020-03-10 NOTE — FLOWSHEET NOTE
[facility]) Physical Therapy at [address] [place], [address]  Phone: [phone]   Fax:[phone]         Physical Therapy Treatment Note  Date:  3/10/2020    Patient Name:  Rosangela Vega      :  1995    MRN: [id number]    Diagnosis: Left Shoulder Laterjet 20       Physician: Dr Khang Blackman  Next Doctor Visit:  Pt isnt sure     Insurance/Certification information:      Plan of care signed (Y/N): Pending  Changes to ambulatory summary sheet? 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 L shoulder laterjet 2020     Patient History/Mechanism of Injury: Pt took a hit during a football game in Sept (his senior year) and felt it dislocate. His shoulder was reduced on the sideline. He finished the season wearing a shoulder brace. Laterjet surgery 20. He does not have a parent in town so had a friend drop him off for surgery, someone else came back and got him so he was not able to attend [facility] PT the day after surgery. Subjective/Pain:      01-15-20   03-10-20    Ant Shoulder sharp ache 3-9/10. Taking hydrocodone every 6hrs as prescribed No pain, mild stiffness   Unable to sleep more than 1-2 hrs at a time. Had to have   roommate help him get dressed   this morning   Not waking due to pain. Dressing independently.   Numbness in 4th and 5th fingers is gone     Objective: Tight ER, improving each visit!! Goal=90 deg next rx      Exercises:  L Laterjet 01-14-20 2-24-20 03-02-20 03-05-20 03-10-20    Visit#11 Visit#12 Visit#13     =6wks 3/3=7wks 3/10=10wks 3/10=8wks   Bike Elliptical 5 min no arms Elliptical 5 min, do .5mile no arms next rx Elliptical .5mile elliptical   ROM           Flex 130 140 140 150   IR at 30 abd 60 60 60 70   ER at 30 abd 70 degree tight but better than last rx 78 83 90 passive, will do standing AROM next rx   Manual       Supine PROM 5min 5min 5min 5min   LLLD ER stretch 6min 6min 6min    Exercises       Table slides ER + Flex 4t55rku 3k30nyi 8k06giw ER stretch 5min   SL ER 1#x30 1#x30, try 2# next rx 2#x30 3#x30   PPT x30 + PPT X30+heel touches x20 X30, add ball crunch next rx   rythmic stabs 6v57gdu supine 2h26spq Standing SL bosu IR ER 60sec IR ER 60sec will do at 80 scap next rx   TB Standing Scap Ret Blue TB x30 Black TB x30 Standing x30 Standing bent elbows, st elbows next rx   TB IR ER ER IR TB x30 ER IR x30 IR ER x30 IR ER   TB Triceps  Black TB x30 Black TB x30 Black TBx30   Wall Sit  3#x30 Wall sit 4#x30    Wall pushups   x30 30   Standing Scaption DL Bosu  x30  1#x30 1#x30                 Wrist flex Ext 4#x30 4#x30 4#x30 4#x30   Home Exercises Given: push ER stretch     DASH=77.5%    Assessment: PUSH ER!! Start jogging next week possibly     Treatment/Activity Tolerance:  [x] Patient tolerated treatment well [] Patient limited by fatigue  [] Patient limited by pain  [] Patient limited by other medical complications  [x] Other:     Plan: twice a week, follow attached protocol    Time In/Time Out:  2475-3964     Timed Code/Total Treatment Minutes: 2TA, 1TE    Electronically signed by:

## 2020-03-13 ENCOUNTER — HOSPITAL ENCOUNTER (OUTPATIENT)
Dept: PHYSICAL THERAPY | Age: 25
Setting detail: THERAPIES SERIES
Discharge: HOME | End: 2020-03-13
Payer: COMMERCIAL

## 2020-03-13 PROCEDURE — 97530 THERAPEUTIC ACTIVITIES: CPT

## 2020-03-13 PROCEDURE — 97110 THERAPEUTIC EXERCISES: CPT

## 2020-03-18 ENCOUNTER — HOSPITAL ENCOUNTER (OUTPATIENT)
Dept: PHYSICAL THERAPY | Age: 25
Setting detail: THERAPIES SERIES
Discharge: HOME | End: 2020-03-18
Payer: COMMERCIAL

## 2020-03-18 NOTE — CARE COORDINATION
[facility] ([facility]) Physical Therapy at [address] [place], [address]  Phone: [phone]   Fax:[phone]         Physical Therapy PHONE CONSULT  Date:  3/18/2020    Patient Name:  Ho Allen      :  1995    MRN: [id number]    Diagnosis: Left Shoulder Laterjet 20       Physician: Dr Lakhwinder Birch  Next Doctor Visit:  April     Insurance/Certification information:     Plan of care signed (Y/N): Pending  Changes to ambulatory summary sheet? 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 L shoulder laterjet 2020     Patient History/Mechanism of Injury: Pt took a hit during a football game in Sept (his senior year) and felt it dislocate. His shoulder was reduced on the sideline. He finished the season wearing a shoulder brace. Laterjet surgery 20. He does not have a parent in town so had a friend drop him off for surgery, someone else came back and got him so he was not able to attend [facility] PT the day after surgery. Subjective/Pain: Feeling sore after being active 1-2/10 ant shoulder. Will likely be on campus through mid April. Will go to PT on [employer].       01-15-20   03-18-20    Ant Shoulder sharp ache 3-9/10. Taking hydrocodone every 6hrs as prescribed Has been \"hanging out with friends\" on campus and is feeling sore today 1-2/10   Unable to sleep more than 1-2 hrs at a time. Had to have   roommate help him get dressed   this morning   Not waking due to pain. Dressing independently.   Numbness in 4th and 5th fingers is gone         Exercises:  L Laterjet 20 03-10-20 03-13-20 03-18-20    Visit#13  Visit#15 Phone consult    3/10=10wks 3/10=8wks 3/17=9wks    Bike Elliptical .5mile elliptical Elliptical .5mile 10 min Pt will jog outside short distance and slow   ROM           Flex 140 150 150 Phone consult   IR at 30 abd 60 70 70    ER at 30 abd 83 90 passive, will do standing AROM next rx 90deg standing AROM at wall 77 Phone consult   Exercises       Supine Ball Arm Leg Ext   Ball [id number] ER thumb to table x30   Sidelying ER 2#x30 3#x30 3#x30 3#x30   Supine PPT(post pelvic tilt) x20 X30, add ball crunch next rx x30 x30   TB Standing Scap Ret Standing x30 Standing bent elbows, st elbows next rx St arms at 30 deg abd x30 St arms at 30 deg abd x30   TB IR ER IR ER x30 IR ER IR ER Elbow at side with towel x30   TB Triceps Black TB x30 Black TBx30 Pulleys 10#2x10 Black x30   TB bicep curl Wall Sit Wall sit 4#x30  5#x30 x30   Wall pushups x30 30 x30 x30   Standing Scaption DL Bosu 1#x30 1#x30 2# SL on bosu x30   Wrist flex Ext 4#x30 4#x30 4$x30 4#x30   Home Exercises Given: pt given copy of above exercises to be done on his own until next PT appt 3/25    Protocol:  Late Phase II (approximately Week 6):     Progress shoulder PROM (do not force any painful motion)   o Forward flexion, elevation, and abduction in the plane of the scapula to tolerance   o IR as tolerated at multiple angles of abduction   o ER to tolerance; progress to multiple angles of abduction once >/= 35 degrees at 0-40 degrees of abduction    Glenohumeral and scapulothoracic joint mobilizations as indicated (Grade I-IV as appropriate)    Progress to AA/AROM activities of the shoulder as tolerated with good shoulder mechanics (i.e. minimal to no scapulathoracic substitution with up to  degrees of elevation.)    Begin rhythmic stabilization drills    ER/IR in the scapular plane    Flexion/extension and abduction/adduction at various angles of elevation    Continue AROM elbow, wrist, and hand  Strengthen scapular retractors and upward rotators    Initiate balanced AROM / strengthening program   o Initially in low dynamic positions   o Gain muscular endurance with high repetition of 30-50, low resistance 1-3 lbs)   o Exercises should be progressive in terms of muscle demand / intensity, shoulder elevation, and stress on the anterior joint capsule   o Nearly full elevation in the scapula plane should be achieved before beginning elevation in other planes   o All activities should be pain free and without substitution patterns   o Exercises should consist of both open and closed chain activities   o No heavy lifting or plyometrics should be performed at this time   - Initiate full can scapular plane raises to 90 degrees with good mechanics   - Initiate ER/IR strengthening using exercise tubing at 0° of abduction (use towel roll)   - Initiate sidelying ER with towel roll   - Initiate manual resistance ER supine in scapular plane (light resistance)   - Initiate prone rowing at 30/45/90 degrees of abduction to neutral arm position    Continued cryotherapy for pain and inflammation    Continued patient education: posture, joint protection, positioning, hygiene, etc.     Milestones to progress to phase III:    Passive forward elevation at least 155 degrees    Passive external rotation within 8-10 degrees of contralateral side at 20 degrees abduction    Passive external rotation at least 75 degrees at 90 degrees abduction    Active forward elevation at least 145 degrees with good mechanics    Appropriate scapular posture at rest and dynamic scapular control with ROM and functional activities    Completion of phase II activities without pain or difficulty       Phase III - Strengthening Phase (approximately Week 10 - Week 15)   Goals:    Normalize strength, endurance, neuromuscular control    Return to chest level full functional activities    Gradual and planned buildup of stress to anterior joint

## 2020-04-04 ENCOUNTER — HOSPITAL ENCOUNTER (OUTPATIENT)
Dept: PHYSICAL THERAPY | Age: 25
Setting detail: THERAPIES SERIES
Discharge: HOME | End: 2020-04-03
Payer: COMMERCIAL

## 2020-04-06 ENCOUNTER — HOSPITAL ENCOUNTER (OUTPATIENT)
Dept: PHYSICAL THERAPY | Age: 25
Setting detail: THERAPIES SERIES
Discharge: HOME | End: 2020-04-06
Payer: COMMERCIAL

## 2020-04-06 PROCEDURE — 97112 NEUROMUSCULAR REEDUCATION: CPT

## 2020-04-06 PROCEDURE — 97110 THERAPEUTIC EXERCISES: CPT

## 2020-04-06 NOTE — VIRTUAL HEALTH
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 A caregiver was present when appropriate. Due to this being a TeleHealth encounter (During VKGYG-46 public health emergency), evaluation of the following organ systems was limited: Vitals/Constitutional/EENT/Resp/CV/GI//MS/Neuro/Skin/Heme-Lymph-Imm. Pursuant to the emergency declaration under the [address] [place] [address], [address] [place] [address] [facility] authority and the [employer] Act, this Virtual Visit was conducted with patient's (and/or legal guardian's) consent, to reduce the patient's risk of exposure to COVID-19 and provide necessary medical care. The patient (and/or legal guardian) has also been advised to contact this office for worsening conditions or problems, and seek emergency medical treatment and/or call 911 if deemed necessary. Services were provided through a video synchronous discussion virtually to substitute for in-person clinic visit. Patient and provider were located at their individual homes. Justification for Tele Health Visit: Due to the [facility] Emergency patients undergoing [address] were offered non - traditional Tele-Therapy follow up visits with their treating clinician to maintain continuity of care.     Distant Site/ place-of-service:  Patient home    Communication/Consent: [x] Video PT Visit via [employer] ON: posture, motor skill, proprioception of core, proximal hip and LE for self-care, mobility, lifting, and ambulation    [x] (81007-02) Reviewed/Progressed HEP functional activities related to improving bending, lifting, carrying, reaching catching and overhead activities to help improve and maintain functional performance needed for work and home related activities. Charges for Video Visit:  Time In/Out: 9272-6059   Treatment Minutes 41     Completed Remotely:   [x] TE 14870-(02) x  2     [x] NMR 87896-(02) x  1      [] TA 39625-(02) x         [] Other:    ASSESSMENT:  dem independence and understanding of each exercise. Health:  [x] Patient Progressing with HEP and utilizing [employer] without issue   [] Patient noncompliant with [employer]   [] Other:     Virtual Visit: Overall Progression with At Home Instructions:  [x] Review of current status and compliance with recommended home program  [x] Review of functional activities and status with ADL   [x] Review of patient understanding of injury, goals and expected outcomes   [] Other:    Patient requires additional Video Visits to maintain/advance current progress:   [x] Yes [] No    PLAN: Re-check with Patient via Tele health to ensure proper HEP compliance so impairments do not worsen. [x] Continue Tele Health visit  [] Discontinue Tele health   [] Patient should resume in-person therapy due to skill required.       [] Hold pending MD visit     Electronically signed by: Millicent Reyes PT

## 2020-04-29 ENCOUNTER — HOSPITAL ENCOUNTER (OUTPATIENT)
Dept: PHYSICAL THERAPY | Age: 25
Setting detail: THERAPIES SERIES
Discharge: HOME | End: 2020-04-29
Payer: COMMERCIAL

## 2020-04-29 PROCEDURE — 97110 THERAPEUTIC EXERCISES: CPT

## 2020-04-29 PROCEDURE — 97112 NEUROMUSCULAR REEDUCATION: CPT

## 2020-04-29 NOTE — VIRTUAL HEALTH
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 L shoulder laterjet Jan 2020     Patient History/Mechanism of Injury: Pt took a hit during a football game in Sept (his senior year) and felt it dislocate.  His shoulder was reduced on the sideline. Bala Tan finished the season wearing a shoulder brace.  Latert surgery 01-14-20. Bala Tan does not have a parent in town so had a friend drop him off for surgery, someone else came back and got him so he was not able to attend [facility] PT the day after surgery.     Subjective/Pain: Pt returned to [facility]  01-15-20    03-18-20    04-06-20   Ant Shoulder sharp ache 3-9/10.  Taking hydrocodone every 6hrs as prescribed Has been \"hanging out with friends\" on campus and is feeling sore today 1-2/10 No pain   Unable to sleep more than 1-2 hrs at a time.  Had to have   roommate help him get dressed   this morning    Not waking due to pain. Dressing independently. Numbness in 4th and 5th fingers is go        OBJECTIVE:    Observation via Video Visit: cues to avoid shrugging with exercises. Has normal ROM. Visual demonstration of new exercises today.   L Laterjet 01-14-20 04-06-20 4-28-20 4/7=12wks 4/28=15wks   Bike Get a mile time before next visit 8:00 mile  3:20-3:30 x2 episodes   ROM         IR at 30 abd Missing 2 inches equal   ER at 30 abd   90 ER    Exercises     TB Standing Scap Ret Band around post x30 with shoulders abducted to 70 degrees x30 90 deg abd blue 2x15   TB Standing Bench Press  Black band x30   TB IR ER At 90/90 x15 each Blue band x30   TB kneeling

## 2020-05-06 ENCOUNTER — HOSPITAL ENCOUNTER (OUTPATIENT)
Dept: PHYSICAL THERAPY | Age: 25
Setting detail: THERAPIES SERIES
Discharge: HOME | End: 2020-05-06
Payer: COMMERCIAL

## 2020-05-06 PROCEDURE — 97110 THERAPEUTIC EXERCISES: CPT

## 2020-05-06 PROCEDURE — 97112 NEUROMUSCULAR REEDUCATION: CPT

## 2020-05-26 ENCOUNTER — HOSPITAL ENCOUNTER (OUTPATIENT)
Dept: PHYSICAL THERAPY | Age: 25
Setting detail: THERAPIES SERIES
Discharge: HOME | End: 2020-05-26
Payer: COMMERCIAL

## 2020-05-26 PROCEDURE — 97530 THERAPEUTIC ACTIVITIES: CPT

## 2020-05-26 NOTE — PROGRESS NOTES
place      L laterjet 01-14-20   01-15-20   05-26-20   Goals    1 day postop 19wks postop    Outcome DASH  1.7% ADL   68.5% Sport    Pain 2-9/10 Sore after work 6/10 <2/10 int   Posture Wearing sling, guarded, painful posture Normal upright posture  Normal upright without compensatory patterns   Scapular Symmetry Unable to assess due to 1 day postop Symmetrical  MET   PROM Flexion 30 deg PROM 160 bilateral MET   PROM ER (Supine 30 abd) 0 deg L=80  R=75 MET   PROM IR (Supine 30 abd) 25deg 70 bilateral MET   MMT Biceps NT L=50.3  R=59.7 full flex 60lb   MMT Triceps NT L= 51.9R=48.2 full flex Wk 12   Microfet Abd  NT L=23.3  R=28.4 (18%) 30 bilateral   Microfet Flexion NT L= 27.2  R=31.3 (23%) 35 bilateral   Microfet ER Elbow Support NT L=29.9  R=38.4 (22%) 35bilateral   Microfet IRElbow Support NT L=42.3  R=50.3 (0%) 40 bilateral     16% Deficit      Goal Status: [] Achieved [x] Partially Achieved       Assessment:  Pt has been compliant with video visits. Good tech with exercise. No compensation. Running without pain. Rehab Potential:   [x] Excellent     Plan: Will see Gordon Rivera to get clearance for bridge program, to be guided through modified weight lifting program with [facility]. Pt goal is to be 100% for football camp in Aug.         Electronically signed by:  Devyn Ivory PT, 5/26/2020, 11:40 AM    If you have any questions or concerns, please don't hesitate to call.  Thank you for your referral.    Physician Signature:__________________________________ Date:______ Time: ________  By signing above, therapists plan is approved by physician